# Patient Record
Sex: FEMALE | Race: WHITE | URBAN - METROPOLITAN AREA
[De-identification: names, ages, dates, MRNs, and addresses within clinical notes are randomized per-mention and may not be internally consistent; named-entity substitution may affect disease eponyms.]

---

## 2023-09-11 ENCOUNTER — APPOINTMENT (RX ONLY)
Dept: URBAN - METROPOLITAN AREA CLINIC 126 | Facility: CLINIC | Age: 68
Setting detail: DERMATOLOGY
End: 2023-09-11

## 2023-09-11 DIAGNOSIS — Z41.9 ENCOUNTER FOR PROCEDURE FOR PURPOSES OTHER THAN REMEDYING HEALTH STATE, UNSPECIFIED: ICD-10-CM

## 2023-09-11 PROCEDURE — ? COSMETIC CONSULTATION: SKIN CARE PRODUCTS AND SERVICES

## 2023-09-11 PROCEDURE — ? COSMETIC CONSULTATION: BOTOX

## 2023-09-11 PROCEDURE — ? COSMETIC CONSULTATION: FILLERS

## 2023-09-11 PROCEDURE — ? COSMETIC CONSULTATION: GENERAL

## 2023-09-11 PROCEDURE — ? DIAGNOSIS COMMENT

## 2023-09-11 PROCEDURE — ? COSMETIC CONSULTATION: BBL

## 2023-09-11 PROCEDURE — ? COSMETIC CONSULTATION: MICRONEEDLING

## 2023-09-11 PROCEDURE — ? COSMETIC CONSULTATION: SCITON MOXI

## 2023-09-11 NOTE — PROCEDURE: DIAGNOSIS COMMENT
Detail Level: Simple
Comment: Patient had bbl for melasma in the past we discussed [employer]. Patient declined photographs at this time will take if she schedules treatment.
Render Risk Assessment In Note?: no